# Patient Record
(demographics unavailable — no encounter records)

---

## 2024-10-15 NOTE — REVIEW OF SYSTEMS
[Negative] : Heme/Lymph [Anxiety] : anxiety [Depression] : depression [Suicidal] : not suicidal [Insomnia] : no insomnia

## 2024-10-15 NOTE — PHYSICAL EXAM
[No Lymphadenopathy] : no lymphadenopathy [Supple] : supple [Normal] : normal rate, regular rhythm, normal S1 and S2 and no murmur heard [No Edema] : there was no peripheral edema [Soft] : abdomen soft [Non Tender] : non-tender [Non-distended] : non-distended [Normal Bowel Sounds] : normal bowel sounds [No Spinal Tenderness] : no spinal tenderness [No Rash] : no rash [Coordination Grossly Intact] : coordination grossly intact [No Focal Deficits] : no focal deficits [Normal Mood] : the mood was normal [de-identified] : friendly [de-identified] : defer to GYN  [de-identified] : surgical scar on chest (CABG)

## 2024-10-15 NOTE — HEALTH RISK ASSESSMENT
[Excellent] : ~his/her~  mood as  excellent [No] : In the past 12 months have you used drugs other than those required for medical reasons? No [No falls in past year] : Patient reported no falls in the past year [1] : 2) Feeling down, depressed, or hopeless for several days (1) [PHQ-2 Positive] : PHQ-2 Positive [Several Days (1)] : 7.) Trouble concentrating on things, such as reading a newspaper or watching television? Several days [Not at All (0)] : 8.) Moving or speaking so slowly that other people could have noticed, or the opposite, moving or speaking faster than usual? Not at all [Mild] : Severity of Depression is Mild [Somewhat Difficult] : How difficult have these problems made it for you to do your work, take care of things at home, or get along with people? Somewhat difficult [PHQ-9 Positive] : PHQ-9 Positive [I have developed a follow-up plan documented below in the note.] : I have developed a follow-up plan documented below in the note. [Former] : Former [10-14] : 10-14 [> 15 Years] : > 15 Years [Patient reported mammogram was normal] : Patient reported mammogram was normal [Patient reported colonoscopy was normal] : Patient reported colonoscopy was normal [None] : None [Alone] : lives alone [Employed] : employed [] :  [# Of Children ___] : has [unfilled] children [Sexually Active] : sexually active [Fully functional (bathing, dressing, toileting, transferring, walking, feeding)] : Fully functional (bathing, dressing, toileting, transferring, walking, feeding) [Fully functional (using the telephone, shopping, preparing meals, housekeeping, doing laundry, using] : Fully functional and needs no help or supervision to perform IADLs (using the telephone, shopping, preparing meals, housekeeping, doing laundry, using transportation, managing medications and managing finances) [Smoke Detector] : smoke detector [Carbon Monoxide Detector] : carbon monoxide detector [Seat Belt] :  uses seat belt [Sunscreen] : uses sunscreen [[id number]] : 2 [[id number]] : 5 [High Risk Behavior] : no high risk behavior [Reports changes in hearing] : Reports no changes in hearing [Reports changes in vision] : Reports no changes in vision [Reports changes in dental health] : Reports no changes in dental health [MammogramDate] : 04/22 [MammogramComments] : BIRADS 1 [ColonoscopyDate] : 10/21 [ColonoscopyComments] : Dr. Sylvester, repeat 2026 [FreeTextEntry2] : ANNELISE

## 2024-10-15 NOTE — HISTORY OF PRESENT ILLNESS
[FreeTextEntry1] : physical [de-identified] : Patient comes for an annual exam.  She reports feeling well. She is compliant with her medications.  She needs renewals on all her medications. She lost her insurance in 4/24 but has new plan now. She feels anxious and depressed at times. She would like to find a therapist.

## 2024-10-15 NOTE — PLAN
[FreeTextEntry1] : Check routine labs today. Discussed diet, exercise, and weight maintenance.  Vaccines UTD. Script given for mammogram. Sister with breast cancer. Colonoscopy repeat 2026. HTN - BP acceptable, fluctuating at home. Continue Losartan 25 mg qd and Metoprolol 25 mg. HLD - check lipids and chemistries, continue Livalo 4 mg daily. DM2 - check A1c. Continue Glimepiride, Metformin, and Trulicity. Needs follow up with endocrine Dr. Yeung. CAD - follow up with cardiology Dr. Birch next week.  All medications were renewed today. Referred to psychologist for anxiety / depression. Check TB screening with [employer] as required for employment.  RTO 3 months for follow-up and fasting labs.

## 2024-10-28 NOTE — PHYSICAL EXAM
[Well Groomed] : well groomed [General Appearance - In No Acute Distress] : no acute distress [Eyelids - No Xanthelasma] : the eyelids demonstrated no xanthelasmas [Normal Oral Mucosa] : normal oral mucosa [No Oral Pallor] : no oral pallor [No Oral Cyanosis] : no oral cyanosis [Normal Jugular Venous A Waves Present] : normal jugular venous A waves present [Normal Jugular Venous V Waves Present] : normal jugular venous V waves present [No Jugular Venous Huitron A Waves] : no jugular venous huitron A waves [Normal Rate] : normal [2+] : left 2+ [Respiration, Rhythm And Depth] : normal respiratory rhythm and effort [Exaggerated Use Of Accessory Muscles For Inspiration] : no accessory muscle use [Auscultation Breath Sounds / Voice Sounds] : lungs were clear to auscultation bilaterally [Abdomen Soft] : soft [Abdomen Tenderness] : non-tender [Abdomen Mass (___ Cm)] : no abdominal mass palpated [Nail Clubbing] : no clubbing of the fingernails [Cyanosis, Localized] : no localized cyanosis [Petechial Hemorrhages (___cm)] : no petechial hemorrhages [Skin Color & Pigmentation] : normal skin color and pigmentation [] : no rash [No Venous Stasis] : no venous stasis [Skin Lesions] : no skin lesions [No Skin Ulcers] : no skin ulcer [No Xanthoma] : no  xanthoma was observed [Oriented To Time, Place, And Person] : oriented to person, place, and time [Affect] : the affect was normal [Mood] : the mood was normal [No Anxiety] : not feeling anxious [Well Developed] : well developed [Well Nourished] : well nourished [No Acute Distress] : no acute distress [Normal Conjunctiva] : normal conjunctiva [Normal Venous Pressure] : normal venous pressure [No Carotid Bruit] : no carotid bruit [Normal S1, S2] : normal S1, S2 [No Rub] : no rub [No Gallop] : no gallop [Rhythm Regular] : regular [Normal S1] : normal S1 [Normal S2] : normal S2 [No Murmur] : no murmurs heard [No Pitting Edema] : no pitting edema present [No Abnormalities] : the abdominal aorta was not enlarged and no bruit was heard [Clear Lung Fields] : clear lung fields [Good Air Entry] : good air entry [No Respiratory Distress] : no respiratory distress  [Soft] : abdomen soft [Non Tender] : non-tender [No Masses/organomegaly] : no masses/organomegaly [Normal Bowel Sounds] : normal bowel sounds [Normal Gait] : normal gait [No Edema] : no edema [No Cyanosis] : no cyanosis [No Clubbing] : no clubbing [No Varicosities] : no varicosities [No Skin Lesions] : no skin lesions [Moves all extremities] : moves all extremities [No Focal Deficits] : no focal deficits [Normal Speech] : normal speech [Alert and Oriented] : alert and oriented [Normal memory] : normal memory [FreeTextEntry1] : healing chest surgical scar, left forarm scar and right leg scar. Ecchymosis on right upper thigh [Right Carotid Bruit] : no bruit heard over the right carotid [Left Carotid Bruit] : no bruit heard over the left carotid [de-identified] : MTI incision intact, Left forearm inicision, RT LE incision

## 2024-10-28 NOTE — REASON FOR VISIT
[CV Risk Factors and Non-Cardiac Disease] : CV risk factors and non-cardiac disease [Hyperlipidemia] : hyperlipidemia [Hypertension] : hypertension [Coronary Artery Disease] : coronary artery disease [FreeTextEntry1] : \par  \par

## 2024-10-28 NOTE — CARDIOLOGY SUMMARY
[de-identified] : Sinus Rhythm  -RSR(V1) -nondiagnostic. -Nonspecific T-abnormality.  [de-identified] : 8/2016 8METs normal SPECT [de-identified] : 8/2016 normal LV function.  7/2023 LVEF 64% reduced compliance [de-identified] : 8/7/23 multi vessel disease CCTA initially showed calcium score of 1472 with severe disease and he LAD, D1, PL CX, and RCA. [de-identified] : 8/10/23 CABGx4 left internal mammary artery to the first diagonal artery, left radial artery to the left anterior descending artery, reverse saphenous vein graft to the obtuse marginal artery and to the posterior descending artery)

## 2024-10-28 NOTE — HISTORY OF PRESENT ILLNESS
[FreeTextEntry1] :  73-year-old woman with a history of diabetes, hyperlipidemia, hypertension, CAD s/p CABG x4 on 8/10/23 with Dr MONTE at [facility] >diag, L radial > LAD, SVG > RPDA, SVG> OM, HFPEF presents for a follow-up visit.   Since her last visit, she has been feeling fatigued. She is not sleeping well.  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  She has been feeling lonely, missing her family but under some stress.  She is scheduled to follow up for mental health services.   She has been trying to monitor her diet but is still not making optimal choices. She had discontinued  STATIN therapy, but now on LIvalo.  Her TG on her most recent B/W has spiked to 545.  She   denies any chest pain, PND, orthopnea, lower extremity edema, near syncope, syncope, strokelike symptoms. Medication reconciliation performed. She is compliant with her medications.

## 2024-10-28 NOTE — CARDIOLOGY SUMMARY
[de-identified] : Sinus Rhythm  -RSR(V1) -nondiagnostic. -Nonspecific T-abnormality.  [de-identified] : 8/2016 8METs normal SPECT [de-identified] : 8/2016 normal LV function.  7/2023 LVEF 64% reduced compliance [de-identified] : 8/7/23 multi vessel disease CCTA initially showed calcium score of 1472 with severe disease and he LAD, D1, PL CX, and RCA. [de-identified] : 8/10/23 CABGx4 left internal mammary artery to the first diagonal artery, left radial artery to the left anterior descending artery, reverse saphenous vein graft to the obtuse marginal artery and to the posterior descending artery)

## 2024-10-28 NOTE — REVIEW OF SYSTEMS
[Negative] : Heme/Lymph [de-identified] : healing chest surgical scar, left forarm scar and right leg scar- intact

## 2024-10-28 NOTE — END OF VISIT
[FreeTextEntry3] : I saw and evaluated the patient and discussed the care with the NP provider above on 10/22/2024 . I agree with the findings and plan as documented in the note above. She denies any anginal symptoms. Clinically she is euvolemic on exam. has ICM. repeat echo. Bp uncontrolled. inc losartan and toprol. She will continue with statin therapy to achieve\maintain goal LDL<100 or ideally <70.

## 2024-10-28 NOTE — PHYSICAL EXAM
[Well Groomed] : well groomed [General Appearance - In No Acute Distress] : no acute distress [Eyelids - No Xanthelasma] : the eyelids demonstrated no xanthelasmas [Normal Oral Mucosa] : normal oral mucosa [No Oral Pallor] : no oral pallor [No Oral Cyanosis] : no oral cyanosis [Normal Jugular Venous A Waves Present] : normal jugular venous A waves present [Normal Jugular Venous V Waves Present] : normal jugular venous V waves present [No Jugular Venous Huitron A Waves] : no jugular venous huitron A waves [Normal Rate] : normal [2+] : left 2+ [Respiration, Rhythm And Depth] : normal respiratory rhythm and effort [Exaggerated Use Of Accessory Muscles For Inspiration] : no accessory muscle use [Auscultation Breath Sounds / Voice Sounds] : lungs were clear to auscultation bilaterally [Abdomen Soft] : soft [Abdomen Tenderness] : non-tender [Abdomen Mass (___ Cm)] : no abdominal mass palpated [Nail Clubbing] : no clubbing of the fingernails [Cyanosis, Localized] : no localized cyanosis [Petechial Hemorrhages (___cm)] : no petechial hemorrhages [Skin Color & Pigmentation] : normal skin color and pigmentation [] : no rash [No Venous Stasis] : no venous stasis [Skin Lesions] : no skin lesions [No Skin Ulcers] : no skin ulcer [No Xanthoma] : no  xanthoma was observed [Oriented To Time, Place, And Person] : oriented to person, place, and time [Affect] : the affect was normal [Mood] : the mood was normal [No Anxiety] : not feeling anxious [Well Developed] : well developed [Well Nourished] : well nourished [No Acute Distress] : no acute distress [Normal Conjunctiva] : normal conjunctiva [Normal Venous Pressure] : normal venous pressure [No Carotid Bruit] : no carotid bruit [Normal S1, S2] : normal S1, S2 [No Rub] : no rub [No Gallop] : no gallop [Rhythm Regular] : regular [Normal S1] : normal S1 [Normal S2] : normal S2 [No Murmur] : no murmurs heard [No Pitting Edema] : no pitting edema present [No Abnormalities] : the abdominal aorta was not enlarged and no bruit was heard [Clear Lung Fields] : clear lung fields [Good Air Entry] : good air entry [No Respiratory Distress] : no respiratory distress  [Soft] : abdomen soft [Non Tender] : non-tender [No Masses/organomegaly] : no masses/organomegaly [Normal Bowel Sounds] : normal bowel sounds [Normal Gait] : normal gait [No Edema] : no edema [No Cyanosis] : no cyanosis [No Clubbing] : no clubbing [No Varicosities] : no varicosities [No Skin Lesions] : no skin lesions [Moves all extremities] : moves all extremities [No Focal Deficits] : no focal deficits [Normal Speech] : normal speech [Alert and Oriented] : alert and oriented [Normal memory] : normal memory [FreeTextEntry1] : healing chest surgical scar, left forarm scar and right leg scar. Ecchymosis on right upper thigh [Right Carotid Bruit] : no bruit heard over the right carotid [Left Carotid Bruit] : no bruit heard over the left carotid [de-identified] : MTI incision intact, Left forearm inicision, RT LE incision

## 2024-10-28 NOTE — DISCUSSION/SUMMARY
[EKG obtained to assist in diagnosis and management of assessed problem(s)] : EKG obtained to assist in diagnosis and management of assessed problem(s) [FreeTextEntry1] : 73 year woman with a history as listed presents for a followup cardiac evaluation.    Nicolette is doing well overall. She denies any anginal symptoms. Clinically she is euvolemic on exam. Her EKG did not reveal any significant ischemic changes.   She will undergo and echocardiogram for surveillance to assure no abnormalities of her cardiac structure and function. Her blood pressure is not optimal, she will increase losartan to 50mg daily in the AM and increase metoprolol succinate to 50mg at night at bedtime.  Metoprolol at bedtime was emphasized to enable better rest during sleep hours. She could not tolerate SGLT2 previously.   She will continue Plavix and stopped her ASA.    She will continue with statin therapy with Livalo to achieve maintain goal ideally <70.  Her TG are elevated, she will continue her OTC fish oil.  Because she had not been consistently on STATIN, will re check fasting lipid panel in one month. If TG are still elevated, judi need to add Tricor to her regimen.  She knows she needs to have DM control for continued CV optimization. Exercise and diet counseling was performed in order to reduce her future cardiovascular risk.   I will call her with B/W and echo results. She will followup with me in 3 months  or sooner if necessary.

## 2024-10-28 NOTE — HISTORY OF PRESENT ILLNESS
[FreeTextEntry1] :  73-year-old woman with a history of diabetes, hyperlipidemia, hypertension, CAD s/p CABG x4 on 8/10/23 with Dr MONTE at [facility] >diag, L radial > LAD, SVG > RPDA, SVG> OM, HFPEF presents for a follow-up visit.   Since her last visit, she has been feeling fatigued. She is not sleeping well.  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  She has been feeling lonely, missing her family but under some stress.  She is scheduled to follow up for mental health services.   She has been trying to monitor her diet but is still not making optimal choices. She had discontinued  STATIN therapy, but now on LIvalo.  Her TG on her most recent B/W has spiked to 545.  She   denies any chest pain, PND, orthopnea, lower extremity edema, near syncope, syncope, strokelike symptoms. Medication reconciliation performed. She is compliant with her medications.

## 2024-10-28 NOTE — REVIEW OF SYSTEMS
[Negative] : Heme/Lymph [de-identified] : healing chest surgical scar, left forarm scar and right leg scar- intact

## 2024-11-30 NOTE — HISTORY OF PRESENT ILLNESS
[FreeTextEntry1] :  74-year-old woman with a history of diabetes, hyperlipidemia, hypertension, CAD s/p CABG x4 on 8/10/23 with Dr MONTE at [facility] >diag, L radial > LAD, SVG > RPDA, SVG> OM, HFPEF presents for a follow-up visit.   Since her last visit, she has been feeling fatigued. She is not sleeping well.  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  She has been feeling lonely, missing her family but under some stress.  She is scheduled to follow up for mental health services.   She has been trying to monitor her diet but is still not making optimal choices. She had discontinued  STATIN therapy, but now on LIvalo.  Her TG on her most recent B/W has spiked to 545.  Since last office visit, She has cut down from rice.  she has been compliant with LIVALO and fish oil.  she is reporting however, that LIVALO may soon no longer be covered.  She also, unfortunately, lost her JOB.  she is looking for a new one at this time.  She   denies any chest pain, PND, orthopnea, lower extremity edema, near syncope, syncope, strokelike symptoms. Medication reconciliation performed. She is compliant with her medications.

## 2024-11-30 NOTE — PHYSICAL EXAM
[Well Groomed] : well groomed [General Appearance - In No Acute Distress] : no acute distress [Eyelids - No Xanthelasma] : the eyelids demonstrated no xanthelasmas [Normal Oral Mucosa] : normal oral mucosa [No Oral Pallor] : no oral pallor [No Oral Cyanosis] : no oral cyanosis [Normal Jugular Venous A Waves Present] : normal jugular venous A waves present [Normal Jugular Venous V Waves Present] : normal jugular venous V waves present [No Jugular Venous Huitron A Waves] : no jugular venous huitron A waves [Normal Rate] : normal [2+] : left 2+ [Respiration, Rhythm And Depth] : normal respiratory rhythm and effort [Exaggerated Use Of Accessory Muscles For Inspiration] : no accessory muscle use [Auscultation Breath Sounds / Voice Sounds] : lungs were clear to auscultation bilaterally [Abdomen Soft] : soft [Abdomen Tenderness] : non-tender [Abdomen Mass (___ Cm)] : no abdominal mass palpated [Nail Clubbing] : no clubbing of the fingernails [Cyanosis, Localized] : no localized cyanosis [Petechial Hemorrhages (___cm)] : no petechial hemorrhages [Skin Color & Pigmentation] : normal skin color and pigmentation [No Venous Stasis] : no venous stasis [] : no rash [Skin Lesions] : no skin lesions [No Skin Ulcers] : no skin ulcer [No Xanthoma] : no  xanthoma was observed [Oriented To Time, Place, And Person] : oriented to person, place, and time [Affect] : the affect was normal [Mood] : the mood was normal [No Anxiety] : not feeling anxious [Well Developed] : well developed [Well Nourished] : well nourished [No Acute Distress] : no acute distress [Normal Conjunctiva] : normal conjunctiva [Normal Venous Pressure] : normal venous pressure [No Carotid Bruit] : no carotid bruit [Normal S1, S2] : normal S1, S2 [No Rub] : no rub [No Gallop] : no gallop [Rhythm Regular] : regular [Normal S1] : normal S1 [Normal S2] : normal S2 [No Murmur] : no murmurs heard [No Pitting Edema] : no pitting edema present [No Abnormalities] : the abdominal aorta was not enlarged and no bruit was heard [Clear Lung Fields] : clear lung fields [Good Air Entry] : good air entry [No Respiratory Distress] : no respiratory distress  [Soft] : abdomen soft [Non Tender] : non-tender [No Masses/organomegaly] : no masses/organomegaly [Normal Bowel Sounds] : normal bowel sounds [Normal Gait] : normal gait [No Edema] : no edema [No Cyanosis] : no cyanosis [No Clubbing] : no clubbing [No Varicosities] : no varicosities [No Skin Lesions] : no skin lesions [Moves all extremities] : moves all extremities [No Focal Deficits] : no focal deficits [Normal Speech] : normal speech [Alert and Oriented] : alert and oriented [Normal memory] : normal memory [FreeTextEntry1] : healing chest surgical scar, left forarm scar and right leg scar. Ecchymosis on right upper thigh [Right Carotid Bruit] : no bruit heard over the right carotid [Left Carotid Bruit] : no bruit heard over the left carotid [de-identified] : MTI incision intact, Left forearm inicision, RT LE incision

## 2024-11-30 NOTE — REVIEW OF SYSTEMS
[Negative] : Heme/Lymph [de-identified] : healing chest surgical scar, left forarm scar and right leg scar- intact

## 2024-11-30 NOTE — END OF VISIT
[FreeTextEntry3] : I saw and evaluated the patient and discussed the care with the NP provider above on 11/26/2024 . I agree with the findings and plan as documented in the note above. recently lost her job. very stressed. noted to that her bp improved with meds. She will continue current bp meds. did not farhana statins. will try repatha.

## 2024-11-30 NOTE — HISTORY OF PRESENT ILLNESS
[FreeTextEntry1] :  74-year-old woman with a history of diabetes, hyperlipidemia, hypertension, CAD s/p CABG x4 on 8/10/23 with Dr MONTE at [facility] >diag, L radial > LAD, SVG > RPDA, SVG> OM, HFPEF presents for a follow-up visit.   Since her last visit, she has been feeling fatigued. She is not sleeping well.  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  She has been feeling lonely, missing her family but under some stress.  She is scheduled to follow up for mental health services.   She has been trying to monitor her diet but is still not making optimal choices. She had discontinued  STATIN therapy, but now on LIvalo.  Her TG on her most recent B/W has spiked to 545.  Since last office visit, She has cut down from rice.  she has been compliant with LIVALO and fish oil.  she is reporting however, that LIVALO may soon no longer be covered.  She also, unfortunately, lost her JOB.  she is looking for a new one at this time.  She   denies any chest pain, PND, orthopnea, lower extremity edema, near syncope, syncope, strokelike symptoms. Medication reconciliation performed. She is compliant with her medications.

## 2024-11-30 NOTE — CARDIOLOGY SUMMARY
[de-identified] : Sinus Rhythm  -RSR(V1) -nondiagnostic. -Nonspecific T-abnormality.  [de-identified] : 8/2016 8METs normal SPECT [de-identified] : 8/2016 normal LV function.  7/2023 LVEF 64% reduced compliance [de-identified] : 8/7/23 multi vessel disease CCTA initially showed calcium score of 1472 with severe disease and he LAD, D1, PL CX, and RCA. [de-identified] : 8/10/23 CABGx4 left internal mammary artery to the first diagonal artery, left radial artery to the left anterior descending artery, reverse saphenous vein graft to the obtuse marginal artery and to the posterior descending artery)

## 2024-11-30 NOTE — PHYSICAL EXAM
[Well Groomed] : well groomed [General Appearance - In No Acute Distress] : no acute distress [Eyelids - No Xanthelasma] : the eyelids demonstrated no xanthelasmas [Normal Oral Mucosa] : normal oral mucosa [No Oral Pallor] : no oral pallor [No Oral Cyanosis] : no oral cyanosis [Normal Jugular Venous A Waves Present] : normal jugular venous A waves present [Normal Jugular Venous V Waves Present] : normal jugular venous V waves present [No Jugular Venous Huitron A Waves] : no jugular venous huitron A waves [Normal Rate] : normal [2+] : left 2+ [Respiration, Rhythm And Depth] : normal respiratory rhythm and effort [Exaggerated Use Of Accessory Muscles For Inspiration] : no accessory muscle use [Auscultation Breath Sounds / Voice Sounds] : lungs were clear to auscultation bilaterally [Abdomen Soft] : soft [Abdomen Tenderness] : non-tender [Abdomen Mass (___ Cm)] : no abdominal mass palpated [Nail Clubbing] : no clubbing of the fingernails [Cyanosis, Localized] : no localized cyanosis [Petechial Hemorrhages (___cm)] : no petechial hemorrhages [Skin Color & Pigmentation] : normal skin color and pigmentation [No Venous Stasis] : no venous stasis [] : no rash [Skin Lesions] : no skin lesions [No Skin Ulcers] : no skin ulcer [No Xanthoma] : no  xanthoma was observed [Oriented To Time, Place, And Person] : oriented to person, place, and time [Affect] : the affect was normal [Mood] : the mood was normal [No Anxiety] : not feeling anxious [Well Developed] : well developed [Well Nourished] : well nourished [No Acute Distress] : no acute distress [Normal Conjunctiva] : normal conjunctiva [Normal Venous Pressure] : normal venous pressure [No Carotid Bruit] : no carotid bruit [Normal S1, S2] : normal S1, S2 [No Rub] : no rub [No Gallop] : no gallop [Rhythm Regular] : regular [Normal S1] : normal S1 [Normal S2] : normal S2 [No Murmur] : no murmurs heard [No Pitting Edema] : no pitting edema present [No Abnormalities] : the abdominal aorta was not enlarged and no bruit was heard [Clear Lung Fields] : clear lung fields [Good Air Entry] : good air entry [No Respiratory Distress] : no respiratory distress  [Soft] : abdomen soft [Non Tender] : non-tender [No Masses/organomegaly] : no masses/organomegaly [Normal Bowel Sounds] : normal bowel sounds [Normal Gait] : normal gait [No Edema] : no edema [No Cyanosis] : no cyanosis [No Clubbing] : no clubbing [No Varicosities] : no varicosities [No Skin Lesions] : no skin lesions [Moves all extremities] : moves all extremities [No Focal Deficits] : no focal deficits [Normal Speech] : normal speech [Alert and Oriented] : alert and oriented [Normal memory] : normal memory [FreeTextEntry1] : healing chest surgical scar, left forarm scar and right leg scar. Ecchymosis on right upper thigh [Right Carotid Bruit] : no bruit heard over the right carotid [Left Carotid Bruit] : no bruit heard over the left carotid [de-identified] : MTI incision intact, Left forearm inicision, RT LE incision

## 2024-11-30 NOTE — REVIEW OF SYSTEMS
[Negative] : Heme/Lymph [de-identified] : healing chest surgical scar, left forarm scar and right leg scar- intact

## 2024-11-30 NOTE — CARDIOLOGY SUMMARY
[de-identified] : Sinus Rhythm  -RSR(V1) -nondiagnostic. -Nonspecific T-abnormality.  [de-identified] : 8/2016 8METs normal SPECT [de-identified] : 8/2016 normal LV function.  7/2023 LVEF 64% reduced compliance [de-identified] : 8/7/23 multi vessel disease CCTA initially showed calcium score of 1472 with severe disease and he LAD, D1, PL CX, and RCA. [de-identified] : 8/10/23 CABGx4 left internal mammary artery to the first diagonal artery, left radial artery to the left anterior descending artery, reverse saphenous vein graft to the obtuse marginal artery and to the posterior descending artery)

## 2024-11-30 NOTE — DISCUSSION/SUMMARY
[EKG obtained to assist in diagnosis and management of assessed problem(s)] : EKG obtained to assist in diagnosis and management of assessed problem(s) [FreeTextEntry1] : 73 year woman with a history as listed presents for a followup cardiac evaluation.    Nicolette is doing well overall. She denies any anginal symptoms. Clinically she is euvolemic on exam. Her EKG did not reveal any significant ischemic changes.  Echocardiogram from 11/2024 demonstrates nml LVEF 57%, mild MR.  Her blood pressure is optimal, she will continue losartan to 50mg daily in the AM and increase metoprolol succinate to 50mg at night at bedtime.  Metoprolol at bedtime was emphasized to enable better rest during sleep hours.   She will continue Plavix and stopped her ASA.    Because Livalo is no longer covered, she will try pravastatin 80mg and Zetia 10mg daily to achieve maintain goal ideally <70.  Her TG have significantly improved. she will continue Fish oil (4). If she does not tolerate pravastatin and zetia she will need to try Repatha.  She knows she needs to have DM control for continued CV optimization.  She could not tolerate SGLT2 previously.  Exercise and diet counseling was performed in order to reduce her future cardiovascular risk.    She will follow up with me in 3 months  or sooner if necessary. She will have B/W prior.

## 2025-01-06 NOTE — PLAN
[FreeTextEntry1] : DM - continue Trulicity, Glimepiride, and Metformin. A1c controlled. Followed with endocrine Dr. Yeung.  HTN - BP controlled. Continue Metoprolol. HLD - check fasting lipid panel. Continue Pravastatin and Zetia. CAD s/p CABG - doing well. Follow up with cardiology Dr. Birch. Aspirin stopped.  RTO

## 2025-01-06 NOTE — PHYSICAL EXAM
[Normal Oropharynx] : the oropharynx was normal [Normal TMs] : both tympanic membranes were normal [Normal] : normal rate, regular rhythm, normal S1 and S2 and no murmur heard [No Edema] : there was no peripheral edema [Soft] : abdomen soft [Non Tender] : non-tender [No Rash] : no rash [Normal Mood] : the mood was normal [de-identified] : friendly, looks great [de-identified] : healed surgical scars along chest [de-identified] : mild b/l hand tremor

## 2025-01-06 NOTE — REVIEW OF SYSTEMS
[Vision Problems] : vision problems [Insomnia] : insomnia [Negative] : Heme/Lymph [de-identified] : feet numbness

## 2025-01-06 NOTE — HISTORY OF PRESENT ILLNESS
[Other: _____] : [unfilled] [FreeTextEntry1] : CAD, HTN, HLD, DM [de-identified] : Pt comes for routine 3 month follow up visit.  She reports feeling well, no complaints.

## 2025-02-25 NOTE — PHYSICAL EXAM
[Normal] : normal rate, regular rhythm, normal S1 and S2 and no murmur heard [No Edema] : there was no peripheral edema [Soft] : abdomen soft [Non Tender] : non-tender [No Rash] : no rash [Normal Mood] : the mood was normal [Non-distended] : non-distended [de-identified] : friendly

## 2025-02-25 NOTE — REVIEW OF SYSTEMS
[Negative] : Heme/Lymph [Muscle Pain] : muscle pain [Recent Change In Weight] : ~T no recent weight change

## 2025-02-25 NOTE — PLAN
[FreeTextEntry1] : HTN - BP controlled. Continue Losartan 50 mg qd and Metoprolol 50 mg. HLD - LDL at goal, TG remains elevated > 500, continue Pravastatin and Zetia. Start Coq10. Continue fish oil.  DM2 - A1c improved. Continue Glimepiride, Metformin, and Trulicity. Follow up with endocrine Dr. Yeung. CAD - stable, follow up with cardiology Dr. Birch in 5/25.  RTO 3 months for follow-up and fasting labs.

## 2025-02-25 NOTE — HISTORY OF PRESENT ILLNESS
[FreeTextEntry1] : HTN, HLD, DM, CAD [de-identified] : Patient comes for routine 3 month follow-up visit and fasting labs.  She reports feeling well. Had COVID about 1 month ago, went to . [place] better after a few days. The Livalo was not covered by insurance, so she was switched by Dr. Birch to Pravastatin 80 mg. She reports myalgias in legs. She is not taking CoQ10. She is also on Zetia and fish oil. She saw endocrine Dr. Yeung about 1 month ago for DM. Meds remain the same. Had lab work done. A1c improved from 7.8 to 7.3. TG remains elevated at 500, LDL controlled.  She plans to move back to the [facility] later this yr.

## 2025-05-20 NOTE — HISTORY OF PRESENT ILLNESS
[FreeTextEntry1] :  74-year-old woman with a history of diabetes, hyperlipidemia, hypertension, CAD s/p CABG x4 on 8/10/23 with Dr MONTE at [facility] >diag, L radial > LAD, SVG > RPDA, SVG> OM, HFPEF presents for a follow-up visit.   Since her last visit, she has been getting more lightheaded with sudden change of positions. She has not been eating much. She is feeling more depressed.  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  She has been feeling lonely, missing her family. She has been more sednetary.  She   denies any chest pain, PND, orthopnea, lower extremity edema, near syncope, syncope, strokelike symptoms. Medication reconciliation performed. She is compliant with her medications.

## 2025-05-20 NOTE — PHYSICAL EXAM
[Well Groomed] : well groomed [General Appearance - In No Acute Distress] : no acute distress [Eyelids - No Xanthelasma] : the eyelids demonstrated no xanthelasmas [Normal Oral Mucosa] : normal oral mucosa [No Oral Pallor] : no oral pallor [No Oral Cyanosis] : no oral cyanosis [Normal Jugular Venous A Waves Present] : normal jugular venous A waves present [Normal Jugular Venous V Waves Present] : normal jugular venous V waves present [No Jugular Venous Huitron A Waves] : no jugular venous huitron A waves [Normal Rate] : normal [2+] : left 2+ [Respiration, Rhythm And Depth] : normal respiratory rhythm and effort [Exaggerated Use Of Accessory Muscles For Inspiration] : no accessory muscle use [Auscultation Breath Sounds / Voice Sounds] : lungs were clear to auscultation bilaterally [Abdomen Soft] : soft [Abdomen Tenderness] : non-tender [Abdomen Mass (___ Cm)] : no abdominal mass palpated [Nail Clubbing] : no clubbing of the fingernails [Cyanosis, Localized] : no localized cyanosis [Petechial Hemorrhages (___cm)] : no petechial hemorrhages [Skin Color & Pigmentation] : normal skin color and pigmentation [] : no rash [No Venous Stasis] : no venous stasis [Skin Lesions] : no skin lesions [No Skin Ulcers] : no skin ulcer [No Xanthoma] : no  xanthoma was observed [FreeTextEntry1] : healing chest surgical scar, left forarm scar and right leg scar. Ecchymosis on right upper thigh [Oriented To Time, Place, And Person] : oriented to person, place, and time [Affect] : the affect was normal [Mood] : the mood was normal [No Anxiety] : not feeling anxious [Well Developed] : well developed [Well Nourished] : well nourished [No Acute Distress] : no acute distress [Normal Conjunctiva] : normal conjunctiva [Normal Venous Pressure] : normal venous pressure [No Carotid Bruit] : no carotid bruit [Normal S1, S2] : normal S1, S2 [No Rub] : no rub [No Gallop] : no gallop [Rhythm Regular] : regular [Normal S1] : normal S1 [Normal S2] : normal S2 [No Murmur] : no murmurs heard [No Pitting Edema] : no pitting edema present [Right Carotid Bruit] : no bruit heard over the right carotid [Left Carotid Bruit] : no bruit heard over the left carotid [No Abnormalities] : the abdominal aorta was not enlarged and no bruit was heard [Clear Lung Fields] : clear lung fields [Good Air Entry] : good air entry [No Respiratory Distress] : no respiratory distress  [Soft] : abdomen soft [Non Tender] : non-tender [No Masses/organomegaly] : no masses/organomegaly [Normal Bowel Sounds] : normal bowel sounds [Normal Gait] : normal gait [No Edema] : no edema [No Cyanosis] : no cyanosis [No Clubbing] : no clubbing [No Varicosities] : no varicosities [No Skin Lesions] : no skin lesions [Moves all extremities] : moves all extremities [No Focal Deficits] : no focal deficits [Normal Speech] : normal speech [Alert and Oriented] : alert and oriented [Normal memory] : normal memory [de-identified] : MTI incision intact, Left forearm inicision, RT LE incision

## 2025-05-20 NOTE — DISCUSSION/SUMMARY
[FreeTextEntry1] : 74 year woman with a history as listed presents for a followup cardiac evaluation.    Nicolette is now more depressed and anxious.  This is likely leading to mild anorexia and her dizziness. Check carotid doppler. may need ssri.  She denies any anginal symptoms. Clinically she is euvolemic on exam. Her EKG did not reveal any significant ischemic changes. Echocardiogram from 11/2024 demonstrates nml LVEF 57%, mild MR. She will continue Plavix and stopped her ASA.  She will continue [id number] daily  and toprol 50mg at night at bedtime.   She will continue pravastatin 80mg and Zetia 10mg daily to achieve maintain goal ideally <70.  Her TG have significantly improved. she will continue Fish oil (4). DM controlled advised.  Exercise and diet counseling was performed in order to reduce her future cardiovascular risk. She will followup with me in 6 months  or sooner if necessary.    [EKG obtained to assist in diagnosis and management of assessed problem(s)] : EKG obtained to assist in diagnosis and management of assessed problem(s)

## 2025-05-20 NOTE — CARDIOLOGY SUMMARY
[de-identified] : Sinus Rhythm  Low voltage in precordial leads. -Nonspecific T-abnormality. [de-identified] : 8/2016 8METs normal SPECT [de-identified] : 8/2016 normal LV function.  7/2023 LVEF 64% reduced compliance [de-identified] : 8/7/23 multi vessel disease CCTA initially showed calcium score of 1472 with severe disease and he LAD, D1, PL CX, and RCA. [de-identified] : 8/10/23 CABGx4 left internal mammary artery to the first diagonal artery, left radial artery to the left anterior descending artery, reverse saphenous vein graft to the obtuse marginal artery and to the posterior descending artery)

## 2025-05-27 NOTE — HISTORY OF PRESENT ILLNESS
[FreeTextEntry1] : HTN, HLD, DM, CAD [de-identified] : Patient comes for routine 3 month follow-up visit and fasting labs.  She reports feeling well. She does feel more depressed and anxious lately. She was not able to find a therapist who took her insurance. She would like to try medication. She is anxious to move back to the [facility] likely later this year.

## 2025-05-27 NOTE — PHYSICAL EXAM
[Normal] : normal rate, regular rhythm, normal S1 and S2 and no murmur heard [No Edema] : there was no peripheral edema [Soft] : abdomen soft [Non Tender] : non-tender [Non-distended] : non-distended [No Rash] : no rash [Normal Mood] : the mood was normal [Normal Affect] : the affect was normal [de-identified] : friendly

## 2025-05-27 NOTE — PLAN
[FreeTextEntry1] : HTN - BP controlled. Continue Losartan 50 mg qd and Metoprolol 50 mg. HLD - check fasting lipids and chemistries, continue Pravastatin and Zetia. DM2 - check A1c. Continue Glimepiride, Metformin, and Trulicity. Followed with endocrine Dr. Yeung. CAD - stable, follow up with cardiology Dr. Birch in 9/25. Anxiety / depression - start Fluoxetine 10 mg daily. Titrate dose as tolerated.   RTO 3 months for follow-up and fasting labs.

## 2025-05-27 NOTE — REVIEW OF SYSTEMS
[Recent Change In Weight] : ~T no recent weight change [Muscle Pain] : muscle pain [Negative] : Heme/Lymph [Anxiety] : anxiety [Depression] : depression [Suicidal] : not suicidal [Insomnia] : no insomnia